# Patient Record
Sex: MALE | Race: WHITE
[De-identification: names, ages, dates, MRNs, and addresses within clinical notes are randomized per-mention and may not be internally consistent; named-entity substitution may affect disease eponyms.]

---
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
## 2024-10-21 NOTE — MHCRPDOC
[facility] Consultation


Consultation


DATE OF CONSULTATION: 2/28/21  





CONSULTATION REQUESTED BY: 





REASON FOR CONSULTATION: In the emergency room awaiting placement and 

determination.





RELEVANT HISTORY: 16-year-old. Patient brought in from the farm at [facility] for 

having suicidal thoughts and plans, which she would not reveal. He later stated 

that his suicidal ideations were passive and not active however, in the past he 

has made 3-4 suicide attempts with a history of burning himself and cutting 

himself also. He is presently also had criminal involvement involving substance 

abuse. He is used alcohol, OxyContin and marijuana.. He states he is presently 

taking sertraline 100 mg, Depakote 500 mg twice a day, naltrexone 50 and traz

odone 100 mg he states he has no present legal issues





PAST PSYCHIATRIC HISTORY: He is getting treatment at [facility]. He has had surgical

history on his hand





PAST MEDICAL HISTORY: Medical history is negative





FAMILY HISTORY:


Mother:. His biological father and mother are both drug addicted and the patient

has been adopted since age 1


Father:. As above


Siblings:, No information


Children:. No information





PERSONAL AND SOCIAL HISTORY: The patient was born and raised in [place].. He

has been a hannah in high school but dropped out


Resides in: [place]


Marital Status: S Single


Children: None


Employment: None





SUBSTANCE ABUSE HISTORY:


Smoking: Most likely


ETOH: Positive


Illicit Drugs: OxyContin, and marijuana





LEGAL HISTORY: .


 


MENTAL STATUS EXAMINATION: 


Patient is a [AGE]-year old male, who is presently in the emergency room.


Speech . No disturbance of speech.


Language skills are. No disturbance.


Thought processes including:. No psychotic thought. 


Thought content: Sorry that he is being transferred.


Abstract reasoning, and computation: Capable of abstraction.


Description of associations:. No loose association.


Description of abnormal or psychotic thoughts:. No psychotic thought.


Judgment: Fair.


Insight: Poor.


Orientation to 3.


Recent and remote memory: Intact.


Attention span and concentration:. No disturbance.


Language:. No disturbance.


Fund of knowledge: Adequate.


Mood: Euthymic.


Affect: Congruent.





DIAGNOSIS:


1. Bipolar disorder. Substance abuse, depression





PLAN:


1., Will be transferred to Hospital.


2.. Continue medications.





Vital Signs





Vital Signs








  Date Time  Temp Pulse Resp B/P (MAP) Pulse Ox O2 Delivery O2 Flow Rate FiO2


 


2/28/21 06:16 98.0 52 14 100/59 (73) 99 Room Air  











Laboratory Data


24H Labs


Laboratory Tests 2


2/27/21 21:15: 


Immature Granulocyte % (Auto) 0.2, Neutrophils (%) (Auto) 46.2, Lymphocytes (%) 

(Auto) 31.4, Monocytes (%) (Auto) 12.0H, Eosinophils (%) (Auto) 10.0H, Basophils

 (%) (Auto) 0.2, Neutrophils # (Auto) 2.5, Lymphocytes # (Auto) 1.7, Monocytes #

 (Auto) 0.7, Eosinophils # (Auto) 0.6H, Basophils # (Auto) 0.0, Nucleated Red 

Blood Cells % (auto) 0.0, Anion Gap 4L, Calcium Level 9.9, Total Bilirubin 0.2, 

Direct Bilirubin < 0.1, Aspartate Amino Transf (AST/SGOT) 34, Alanine 

Aminotransferase (ALT/SGPT) 38, Alkaline Phosphatase 122H, Total Protein 7.3, 

Albumin 3.9, Albumin/Globulin Ratio 1.1, Thyroid Stimulating Hormone (TSH) 

4.970H, Salicylates Level < 1.7L, Urine Opiates Screen NEGATIVE, Urine Methadone

 Screen NEGATIVE, Acetaminophen Level < 2.0L, Urine Barbiturates Screen 

NEGATIVE, Valproic Acid (Depakene) Level 48.5L, Urine Phencyclidine Screen 

NEGATIVE, Urine Amphetamines Screen NEGATIVE, Urine Benzodiazepines Screen 

NEGATIVE, Urine Cocaine Metabolite Screen NEGATIVE, Urine Cannabinoids Screen 

NEGATIVE, Ethyl Alcohol Level < 0.003





Home Medications


Current Medications





Current Medications








 Medications


  (Trade)  Dose


 Ordered  Sig/Amarilis


 Route


 PRN Reason  Start Time


 Stop Time Status Last Admin


Dose Admin


 


 Divalproex Sodium


  (Depakote Er)  500 mg  BID


 PO


   2/28/21 21:00


 2/28/21 10:42 DC  





 


 Divalproex Sodium


  (Depakote)  500 mg  BID


 PO


   2/28/21 09:00


    2/28/21 10:53





 


 Home Med


  (Med Rec


 Complete!)    ASDIRECTED


 XX


   2/28/21 05:20


 2/28/21 05:21 DC  





 


 Naltrexone HCl


  (Revia)  50 mg  QHS


 PO


   2/27/21 21:00


    2/28/21 00:14





 


 Sertraline HCl


  (Zoloft)  100 mg  DAILY


 PO


   2/28/21 09:00


    2/28/21 10:52











Scheduled


Divalproex Sodium (Depakote) 500 Mg Tablet.dr, 500 MG PO BID, (Reported)


Naltrexone HCl (Naltrexone HCl) 50 Mg Tablet, 50 MG PO QHS, (Reported)


Sertraline Hcl (Zoloft) 100 Mg Tablet, 100 MG PO QAM, (Reported)


Trazodone HCl (Trazodone HCl) 100 Mg Tablet, 100 MG PO QHS, (Reported)





Allergies


Coded Allergies:  


     No Known Allergies (Unverified , 2/27/21)











JOHNATHAN MURGUIA MD            Feb 28, 2021 12:08 Nothing per rectum/Nothing per vagina/No tub baths/No douching/No tampons/No intercourse